# Patient Record
Sex: FEMALE | URBAN - METROPOLITAN AREA
[De-identification: names, ages, dates, MRNs, and addresses within clinical notes are randomized per-mention and may not be internally consistent; named-entity substitution may affect disease eponyms.]

---

## 2021-08-18 ENCOUNTER — TELEPHONE ENCOUNTER (OUTPATIENT)
Dept: URBAN - METROPOLITAN AREA CLINIC 72 | Facility: CLINIC | Age: 56
End: 2021-08-18

## 2021-10-22 ENCOUNTER — WEB ENCOUNTER (OUTPATIENT)
Dept: URBAN - METROPOLITAN AREA CLINIC 113 | Facility: CLINIC | Age: 56
End: 2021-10-22

## 2021-10-22 ENCOUNTER — OFFICE VISIT (OUTPATIENT)
Dept: URBAN - METROPOLITAN AREA CLINIC 113 | Facility: CLINIC | Age: 56
End: 2021-10-22
Payer: COMMERCIAL

## 2021-10-22 VITALS
HEART RATE: 63 BPM | SYSTOLIC BLOOD PRESSURE: 128 MMHG | RESPIRATION RATE: 18 BRPM | HEIGHT: 61 IN | TEMPERATURE: 97.7 F | DIASTOLIC BLOOD PRESSURE: 84 MMHG | BODY MASS INDEX: 32.47 KG/M2 | WEIGHT: 172 LBS

## 2021-10-22 DIAGNOSIS — R74.8 ELEVATED LIVER ENZYMES: ICD-10-CM

## 2021-10-22 PROCEDURE — 99204 OFFICE O/P NEW MOD 45 MIN: CPT | Performed by: INTERNAL MEDICINE

## 2021-10-22 RX ORDER — GABAPENTIN 100 MG/1
1 CAPSULE CAPSULE ORAL ONCE A DAY
Status: DISCONTINUED | COMMUNITY

## 2021-10-22 RX ORDER — HYDROCHLOROTHIAZIDE 12.5 MG/1
1 CAPSULE IN THE MORNING CAPSULE ORAL ONCE A DAY
Status: ACTIVE | COMMUNITY

## 2021-10-22 RX ORDER — AMLODIPINE BESYLATE AND BENAZEPRIL HYDROCHLORIDE 10; 20 MG/1; MG/1
AS DIRECTED CAPSULE ORAL
Status: ACTIVE | COMMUNITY

## 2021-10-22 RX ORDER — FAMOTIDINE 40 MG/1
1 TABLET AT BEDTIME TABLET, FILM COATED ORAL ONCE A DAY
Status: ACTIVE | COMMUNITY

## 2021-10-22 RX ORDER — METFORMIN HYDROCHLORIDE 1000 MG/1
1 TABLET WITH A MEAL TABLET, FILM COATED ORAL ONCE A DAY
Status: ACTIVE | COMMUNITY

## 2021-10-22 NOTE — HPI-TODAY'S VISIT:
Abby Noguera is a 56-year-old female with DM, HTN, dyslipidemia and diabetic neuropathy who is referred by [facility] ([place]) for elevated LFTs.    Iron studies done on January 25, 2020 had showed a ferritin of 140, and iron of 130, and a TIBC of 326, yielding a 40% iron saturation level. AST was 30 and ALT 53 at that time. The patient had additional laboratory work done on December 1, 2020 notable for an AST of 51 and ALT at 90. She had repeat labs done on April 19, 2021 showed an alkaline phosphatase of 128, AST of 85, ALT of 156, and albumin of 4.4, and a total bilirubin of 0.2.  Hepatitis serologies were drawn at that time and were negative.  The patient had an ultrasound examination done of the liver on August 24, 2021 which showed a normal gallbladder, common bile duct diameter of 2.7 mm, and an enlarged liver with diffuse fatty infiltration.  Repeat laboratory work done August 25, 2021 showed an AST of 111, ALT of 226, alkaline phosphatase of 125, and a total bilirubin of 0.4.  Repeat liver enzymes on October 6, 2021 showed an alk phos as of 123, and AST of 116, ALT of 230, and albumin 4.6.  The patient has some fullness in the right upper quadrant times, but no kenneth pain.  She does not drink alcohol, has no history of IV drug abuse, tattoos or blood transfusions.  There is no family history of liver enzyme elevation.  There is no family history of liver disease.  She does make reference to having been diagnosed as having "autoimmune liver disease" in the past.  She has not been treated.  The patient had upper endoscopy on several occasions in the past, the last about 3 years ago for complaints of reflux.  She had a colonoscopy done several years ago in [place], but does not recall the results.

## 2021-10-24 LAB
A/G RATIO: 1.8
ACTIN (SMOOTH MUSCLE) ANTIBODY: 15
ALBUMIN: 4.7
ALKALINE PHOSPHATASE: 106
ANA DIRECT: NEGATIVE
AST (SGOT): 111
BILIRUBIN, TOTAL: 0.3
BUN: 13
CALCIUM: 10.1
CHLORIDE: 101
CHOLESTEROL, TOTAL: 254
CREATININE: 0.57
EGFR IF AFRICN AM: 119
EGFR IF NONAFRICN AM: 103
GLOBULIN, TOTAL: 2.6
GLUCOSE: 115
HEMATOCRIT: 46.3
HEMOGLOBIN: 15.7
LDH: 202
MCH: 33.5
MCHC: 33.9
MCV: 99
MITOCHONDRIAL (M2) ANTIBODY: 24.1
NRBC: (no result)
PHOSPHORUS: 3.8
POTASSIUM: 4
PROTEIN, TOTAL: 7.3
RBC: 4.69
RDW: 12.7
SODIUM: 144
URIC ACID: 5.7
WBC: 5.8

## 2021-10-27 ENCOUNTER — TELEPHONE ENCOUNTER (OUTPATIENT)
Dept: URBAN - METROPOLITAN AREA CLINIC 113 | Facility: CLINIC | Age: 56
End: 2021-10-27

## 2021-10-27 ENCOUNTER — LAB OUTSIDE AN ENCOUNTER (OUTPATIENT)
Dept: URBAN - METROPOLITAN AREA CLINIC 113 | Facility: CLINIC | Age: 56
End: 2021-10-27

## 2021-10-27 RX ORDER — AMLODIPINE BESYLATE AND BENAZEPRIL HYDROCHLORIDE 10; 20 MG/1; MG/1
AS DIRECTED CAPSULE ORAL
Status: ACTIVE | COMMUNITY

## 2021-10-27 RX ORDER — HYDROCHLOROTHIAZIDE 12.5 MG/1
1 CAPSULE IN THE MORNING CAPSULE ORAL ONCE A DAY
Status: ACTIVE | COMMUNITY

## 2021-10-27 RX ORDER — METFORMIN HYDROCHLORIDE 1000 MG/1
1 TABLET WITH A MEAL TABLET, FILM COATED ORAL ONCE A DAY
Status: ACTIVE | COMMUNITY

## 2021-10-27 RX ORDER — FAMOTIDINE 40 MG/1
1 TABLET AT BEDTIME TABLET, FILM COATED ORAL ONCE A DAY
Status: ACTIVE | COMMUNITY

## 2021-10-27 RX ORDER — URSODIOL 300 MG/1
1 CAPSULE CAPSULE ORAL TWICE A DAY
Qty: 60 | Refills: 1 | OUTPATIENT
Start: 2021-10-27

## 2021-11-23 LAB
A/G RATIO: 1.9
ALBUMIN: 4.5
ALKALINE PHOSPHATASE: 96
ALT (SGPT): 182
AST (SGOT): 82
BILIRUBIN, TOTAL: 0.3
BUN/CREATININE RATIO: 17
BUN: 9
CALCIUM: 9.4
CARBON DIOXIDE, TOTAL: 28
CHLORIDE: 101
CREATININE: 0.53
EGFR IF AFRICN AM: 122
EGFR IF NONAFRICN AM: 106
GLOBULIN, TOTAL: 2.4
GLUCOSE: 104
POTASSIUM: 4.4
PROTEIN, TOTAL: 6.9
SODIUM: 139

## 2021-11-29 ENCOUNTER — ERX REFILL RESPONSE (OUTPATIENT)
Dept: URBAN - METROPOLITAN AREA CLINIC 113 | Facility: CLINIC | Age: 56
End: 2021-11-29

## 2021-11-29 RX ORDER — URSODIOL 300 MG/1
1 CAPSULE CAPSULE ORAL TWICE A DAY
Qty: 60 | Refills: 1 | OUTPATIENT

## 2021-11-29 RX ORDER — URSODIOL 300 MG/1
TAKE 1 CAPSULE BY MOUTH TWICE A DAY CAPSULE ORAL
Qty: 60 CAPSULE | Refills: 2 | OUTPATIENT

## 2021-12-27 ENCOUNTER — ERX REFILL RESPONSE (OUTPATIENT)
Dept: URBAN - METROPOLITAN AREA CLINIC 113 | Facility: CLINIC | Age: 56
End: 2021-12-27

## 2021-12-27 RX ORDER — URSODIOL 300 MG/1
TAKE 1 CAPSULE BY MOUTH TWICE A DAY CAPSULE ORAL
Qty: 60 CAPSULE | Refills: 2 | OUTPATIENT

## 2022-02-04 ENCOUNTER — OFFICE VISIT (OUTPATIENT)
Dept: URBAN - METROPOLITAN AREA CLINIC 113 | Facility: CLINIC | Age: 57
End: 2022-02-04
Payer: COMMERCIAL

## 2022-02-04 ENCOUNTER — ERX REFILL RESPONSE (OUTPATIENT)
Dept: URBAN - METROPOLITAN AREA CLINIC 113 | Facility: CLINIC | Age: 57
End: 2022-02-04

## 2022-02-04 VITALS
SYSTOLIC BLOOD PRESSURE: 136 MMHG | WEIGHT: 172 LBS | RESPIRATION RATE: 18 BRPM | DIASTOLIC BLOOD PRESSURE: 87 MMHG | HEART RATE: 69 BPM | BODY MASS INDEX: 32.47 KG/M2 | TEMPERATURE: 98.1 F | HEIGHT: 61 IN

## 2022-02-04 DIAGNOSIS — R74.8 ELEVATED LIVER ENZYMES: ICD-10-CM

## 2022-02-04 PROCEDURE — 99214 OFFICE O/P EST MOD 30 MIN: CPT | Performed by: INTERNAL MEDICINE

## 2022-02-04 RX ORDER — URSODIOL 300 MG/1
TAKE 1 CAPSULE BY MOUTH TWICE A DAY CAPSULE ORAL
Qty: 60 CAPSULE | Refills: 2 | OUTPATIENT

## 2022-02-04 RX ORDER — URSODIOL 300 MG/1
TAKE 1 CAPSULE BY MOUTH TWICE A DAY CAPSULE ORAL
Qty: 60 CAPSULE | Refills: 2 | Status: ACTIVE | COMMUNITY

## 2022-02-04 RX ORDER — HYDROCHLOROTHIAZIDE 12.5 MG/1
1 CAPSULE IN THE MORNING CAPSULE ORAL ONCE A DAY
Status: ACTIVE | COMMUNITY

## 2022-02-04 RX ORDER — METFORMIN HYDROCHLORIDE 1000 MG/1
1 TABLET WITH A MEAL TABLET, FILM COATED ORAL ONCE A DAY
Status: ACTIVE | COMMUNITY

## 2022-02-04 RX ORDER — FAMOTIDINE 40 MG/1
1 TABLET AT BEDTIME TABLET, FILM COATED ORAL ONCE A DAY
Status: DISCONTINUED | COMMUNITY

## 2022-02-04 RX ORDER — AMLODIPINE BESYLATE AND BENAZEPRIL HYDROCHLORIDE 10; 20 MG/1; MG/1
AS DIRECTED CAPSULE ORAL
Status: ACTIVE | COMMUNITY

## 2022-02-04 NOTE — HPI-TODAY'S VISIT:
Abby Noguera is a 56-year-old female with DM, HTN, dyslipidemia and diabetic neuropathy who is referred by [facility] ([place]) for elevated LFTs. She was last seen here for this on 10/22/21.   Iron studies done on January 25, 2020 had showed a ferritin of 140, and iron of 130, and a TIBC of 326, yielding a 40% iron saturation level. AST was 30 and ALT 53 at that time. The patient had additional laboratory work done on December 1, 2020 notable for an AST of 51 and ALT at 90. She had repeat labs done on April 19, 2021 showed an alkaline phosphatase of 128, AST of 85, ALT of 156, and albumin of 4.4, and a total bilirubin of 0.2.  Hepatitis serologies were drawn at that time and were negative.  The patient had an ultrasound examination done of the liver on August 24, 2021 which showed a normal gallbladder, common bile duct diameter of 2.7 mm, and an enlarged liver with diffuse fatty infiltration.  Repeat laboratory work done August 25, 2021 showed an AST of 111, ALT of 226, alkaline phosphatase of 125, and a total bilirubin of 0.4.  Repeat liver enzymes on October 6, 2021 showed an alk phos as of 123, and AST of 116, ALT of 230, and albumin 4.6.  The patient has some fullness in the right upper quadrant times, but no kenneth pain.  She does not drink alcohol, has no history of IV drug abuse, tattoos or blood transfusions.  There is no family history of liver enzyme elevation.  There is no family history of liver disease.  She does make reference to having been diagnosed as having "autoimmune liver disease" in the past.  She has not been treated.  The patient had upper endoscopy on several occasions in the past, the last about 3 years ago for complaints of reflux.  She had a colonoscopy done several years ago in [place], but does not recall the results.  She returns today with no new complaints.  She is accompanied by her daughter, who serves as an

## 2022-03-01 ENCOUNTER — ERX REFILL RESPONSE (OUTPATIENT)
Dept: URBAN - METROPOLITAN AREA CLINIC 113 | Facility: CLINIC | Age: 57
End: 2022-03-01

## 2022-03-01 RX ORDER — URSODIOL 300 MG/1
TAKE 1 CAPSULE BY MOUTH TWICE A DAY CAPSULE ORAL
Qty: 60 CAPSULE | Refills: 2 | OUTPATIENT

## 2022-03-03 LAB
A/G RATIO: 1.7
ALBUMIN: 4.4
ALKALINE PHOSPHATASE: 104
ALT (SGPT): 246
AST (SGOT): 146
BILIRUBIN, TOTAL: <0.2
BUN/CREATININE RATIO: 27
BUN: 17
CALCIUM: 9.8
CARBON DIOXIDE, TOTAL: 23
CHLORIDE: 102
CREATININE: 0.64
EGFR: 103
GGT: 47
GLOBULIN, TOTAL: 2.6
GLUCOSE: 150
POTASSIUM: 4.6
PROTEIN, TOTAL: 7
SODIUM: 143

## 2022-03-22 ENCOUNTER — ERX REFILL RESPONSE (OUTPATIENT)
Dept: URBAN - METROPOLITAN AREA CLINIC 113 | Facility: CLINIC | Age: 57
End: 2022-03-22

## 2022-03-22 RX ORDER — URSODIOL 300 MG/1
1 CAPSULE CAPSULE ORAL TWICE A DAY
Qty: 60 | Refills: 0 | OUTPATIENT

## 2022-03-22 RX ORDER — URSODIOL 300 MG/1
TAKE 1 CAPSULE BY MOUTH TWICE A DAY CAPSULE ORAL
Qty: 60 CAPSULE | Refills: 2 | OUTPATIENT

## 2022-03-24 ENCOUNTER — ERX REFILL RESPONSE (OUTPATIENT)
Dept: URBAN - METROPOLITAN AREA CLINIC 113 | Facility: CLINIC | Age: 57
End: 2022-03-24

## 2022-03-24 RX ORDER — URSODIOL 300 MG/1
1 CAPSULE CAPSULE ORAL TWICE A DAY
Qty: 60 | Refills: 0 | OUTPATIENT

## 2022-03-28 ENCOUNTER — TELEPHONE ENCOUNTER (OUTPATIENT)
Dept: URBAN - METROPOLITAN AREA CLINIC 113 | Facility: CLINIC | Age: 57
End: 2022-03-28

## 2022-03-28 RX ORDER — URSODIOL 300 MG/1
1 CAPSULE ORALLY TWICE A DAY CAPSULE ORAL
Qty: 180 | Refills: 3

## 2022-04-04 ENCOUNTER — ERX REFILL RESPONSE (OUTPATIENT)
Dept: URBAN - METROPOLITAN AREA CLINIC 113 | Facility: CLINIC | Age: 57
End: 2022-04-04

## 2022-04-04 RX ORDER — URSODIOL 300 MG/1
1 CAPSULE CAPSULE ORAL TWICE A DAY
Qty: 60 | Refills: 0 | OUTPATIENT

## 2022-05-09 ENCOUNTER — OFFICE VISIT (OUTPATIENT)
Dept: URBAN - METROPOLITAN AREA CLINIC 113 | Facility: CLINIC | Age: 57
End: 2022-05-09

## 2022-08-09 ENCOUNTER — OFFICE VISIT (OUTPATIENT)
Dept: URBAN - METROPOLITAN AREA CLINIC 113 | Facility: CLINIC | Age: 57
End: 2022-08-09

## 2023-08-23 ENCOUNTER — WEB ENCOUNTER (OUTPATIENT)
Dept: URBAN - METROPOLITAN AREA CLINIC 107 | Facility: CLINIC | Age: 58
End: 2023-08-23

## 2023-08-24 ENCOUNTER — LAB OUTSIDE AN ENCOUNTER (OUTPATIENT)
Dept: URBAN - METROPOLITAN AREA CLINIC 107 | Facility: CLINIC | Age: 58
End: 2023-08-24

## 2023-08-24 ENCOUNTER — OFFICE VISIT (OUTPATIENT)
Dept: URBAN - METROPOLITAN AREA CLINIC 107 | Facility: CLINIC | Age: 58
End: 2023-08-24
Payer: COMMERCIAL

## 2023-08-24 ENCOUNTER — DASHBOARD ENCOUNTERS (OUTPATIENT)
Age: 58
End: 2023-08-24

## 2023-08-24 VITALS
HEIGHT: 61 IN | BODY MASS INDEX: 31.91 KG/M2 | SYSTOLIC BLOOD PRESSURE: 129 MMHG | WEIGHT: 169 LBS | DIASTOLIC BLOOD PRESSURE: 77 MMHG | RESPIRATION RATE: 18 BRPM | HEART RATE: 72 BPM | TEMPERATURE: 97.3 F

## 2023-08-24 DIAGNOSIS — R74.8 ELEVATED LIVER ENZYMES: ICD-10-CM

## 2023-08-24 DIAGNOSIS — K21.00 GASTROESOPHAGEAL REFLUX DISEASE WITH ESOPHAGITIS WITHOUT HEMORRHAGE: ICD-10-CM

## 2023-08-24 DIAGNOSIS — Z86.010 HISTORY OF ADENOMATOUS POLYP OF COLON: ICD-10-CM

## 2023-08-24 PROBLEM — 266433003: Status: ACTIVE | Noted: 2023-08-24

## 2023-08-24 PROBLEM — 429047008: Status: ACTIVE | Noted: 2023-08-24

## 2023-08-24 PROCEDURE — 99215 OFFICE O/P EST HI 40 MIN: CPT | Performed by: INTERNAL MEDICINE

## 2023-08-24 RX ORDER — URSODIOL 300 MG/1
1 CAPSULE CAPSULE ORAL TWICE A DAY
Qty: 60 | Refills: 0 | Status: ACTIVE | COMMUNITY

## 2023-08-24 RX ORDER — HYDROCHLOROTHIAZIDE 12.5 MG/1
1 CAPSULE IN THE MORNING CAPSULE ORAL ONCE A DAY
Status: ACTIVE | COMMUNITY

## 2023-08-24 RX ORDER — AMLODIPINE BESYLATE AND BENAZEPRIL HYDROCHLORIDE 10; 20 MG/1; MG/1
AS DIRECTED CAPSULE ORAL
Status: ACTIVE | COMMUNITY

## 2023-08-24 RX ORDER — FAMOTIDINE 40 MG/1
1 TABLET AT BEDTIME TABLET, FILM COATED ORAL ONCE A DAY
Status: ACTIVE | COMMUNITY

## 2023-08-24 RX ORDER — OMEPRAZOLE 40 MG/1
TAKE 1 CAPSULE BY MOUTH EVERY DAY CAPSULE, DELAYED RELEASE ORAL TWICE DAILY
Qty: 60 | Refills: 3 | OUTPATIENT
Start: 2023-08-24

## 2023-08-24 RX ORDER — METFORMIN HYDROCHLORIDE 1000 MG/1
1 TABLET WITH A MEAL TABLET, FILM COATED ORAL ONCE A DAY
Status: ACTIVE | COMMUNITY

## 2023-08-24 NOTE — EXAM-FUNCTIONAL ASSESSMENT
General--no acute distress Eyes--anicteric HENT--normocephalic, atraumatic head Neck--no lymphadenopathy Chest--normal breath sounds Heart--regular rate and rhythm; 2/6 systolic murmur RUSB Abdomen--soft, non tender, non distended, bowel sounds present Musculoskeletal--normal gait and station Skin--no rashes Neurologic--Alert and oriented x 3 Psychiatric--stable mood, appropriate affect

## 2023-08-24 NOTE — HPI-TODAY'S VISIT:
Abby Noguera is a 58-year-old female with DM, HTN, dyslipidemia and diabetic neuropathy who is referred by [facility] ([place]) for elevated LFTs. She was last seen here for this on 10/22/21.   Iron studies done on January 25, 2020 had showed a ferritin of 140, and iron of 130, and a TIBC of 326, yielding a 40% iron saturation level. AST was 30 and ALT 53 at that time. The patient had additional laboratory work done on December 1, 2020 notable for an AST of 51 and ALT at 90. She had repeat labs done on April 19, 2021 showed an alkaline phosphatase of 128, AST of 85, ALT of 156, and albumin of 4.4, and a total bilirubin of 0.2.  Hepatitis serologies were drawn at that time and were negative.  The patient had an ultrasound examination done of the liver on August 24, 2021 which showed a normal gallbladder, common bile duct diameter of 2.7 mm, and an enlarged liver with diffuse fatty infiltration.  Repeat laboratory work done August 25, 2021 showed an AST of 111, ALT of 226, alkaline phosphatase of 125, and a total bilirubin of 0.4.  Repeat liver enzymes on October 6, 2021 showed an alk phos as of 123, and AST of 116, ALT of 230, and albumin 4.6.  The patient has some fullness in the right upper quadrant at times, but no kenneth pain.  She does not drink alcohol, has no history of IV drug abuse, tattoos or blood transfusions.  There is no family history of liver enzyme elevation.  There is no family history of liver disease.  She does make reference to having been diagnosed as having "autoimmune liver disease" in the past.  At the time of her last office visit, she had not been treated.  The patient had upper endoscopy on several occasions in the past, the last about 3 years ago for complaints of reflux.  She had a colonoscopy done several years ago in [place], but does not recall the results.  After her last office visit, the patient had labs done including a complete metabolic panel, autoimmune serologies and an antimitochondrial antibody.  Her antimitochondrial antibody was positive at 24.1.  Alkaline phosphatase is 104, , , total bilirubin less than 0.2, albumin 4.4, and GGT 47.  She was begun on ursodiol 300 mg p.o. twice daily but failed to return for follow-up.  Her primary care physician in [place] did forward labs from 6/1/2023 showing an AST of 46, ALT of 80, alkaline phosphatase of 98, total bilirubin of less than 0.2, and an albumin of 4.6.  She returns today after a protracted absence, having missed several office visits, most recently on 5/9/2022 and 8/9/2022. She is accompanied by her daughter, who serves as an . On this occasion, she was able to provide a more detailed history.  She is having reflux every day, despite taking famotidine 40 mg p.o. twice daily.  She is not on a PPI.  She describes dysphagia on occasion, and tells me that she has had dilation of esophageal strictures in the past.  She also has a history of colon polyps, and last had colonoscopy roughly 5 years ago.  She tells me that labs have been done by her primary care physician, although she only knows that they were abnormal.

## 2023-09-11 ENCOUNTER — ERX REFILL RESPONSE (OUTPATIENT)
Dept: URBAN - METROPOLITAN AREA CLINIC 113 | Facility: CLINIC | Age: 58
End: 2023-09-11

## 2023-09-11 RX ORDER — URSODIOL 300 MG/1
TAKE 1 CAPSULE BY MOUTH TWICE A DAY FOR 90 DAYS CAPSULE ORAL
Qty: 180 CAPSULE | Refills: 3 | OUTPATIENT

## 2023-09-11 RX ORDER — URSODIOL 300 MG/1
TAKE 1 CAPSULE BY MOUTH TWICE A DAY FOR 90 DAYS CAPSULE ORAL
Qty: 180 CAPSULE | Refills: 4 | OUTPATIENT

## 2023-09-19 ENCOUNTER — ERX REFILL RESPONSE (OUTPATIENT)
Dept: URBAN - METROPOLITAN AREA CLINIC 107 | Facility: CLINIC | Age: 58
End: 2023-09-19

## 2023-09-19 RX ORDER — OMEPRAZOLE 40 MG/1
TAKE 1 CAPSULE BY MOUTH EVERY DAY CAPSULE, DELAYED RELEASE ORAL TWICE DAILY
Qty: 60 | Refills: 3 | OUTPATIENT

## 2023-09-19 RX ORDER — OMEPRAZOLE 40 MG/1
TAKE 1 CAPSULE BY MOUTH TWICE A DAY CAPSULE, DELAYED RELEASE ORAL
Qty: 60 CAPSULE | Refills: 3 | OUTPATIENT

## 2023-09-22 ENCOUNTER — TELEPHONE ENCOUNTER (OUTPATIENT)
Dept: URBAN - METROPOLITAN AREA CLINIC 113 | Facility: CLINIC | Age: 58
End: 2023-09-22

## 2023-10-13 ENCOUNTER — TELEPHONE ENCOUNTER (OUTPATIENT)
Dept: URBAN - METROPOLITAN AREA CLINIC 6 | Facility: CLINIC | Age: 58
End: 2023-10-13

## 2023-11-03 ENCOUNTER — OFFICE VISIT (OUTPATIENT)
Dept: URBAN - METROPOLITAN AREA SURGERY CENTER 25 | Facility: SURGERY CENTER | Age: 58
End: 2023-11-03

## 2023-11-16 ENCOUNTER — OFFICE VISIT (OUTPATIENT)
Dept: URBAN - METROPOLITAN AREA SURGERY CENTER 25 | Facility: SURGERY CENTER | Age: 58
End: 2023-11-16

## 2023-11-28 ENCOUNTER — OFFICE VISIT (OUTPATIENT)
Dept: URBAN - METROPOLITAN AREA CLINIC 107 | Facility: CLINIC | Age: 58
End: 2023-11-28

## 2025-01-21 ENCOUNTER — ERX REFILL RESPONSE (OUTPATIENT)
Dept: URBAN - METROPOLITAN AREA CLINIC 113 | Facility: CLINIC | Age: 60
End: 2025-01-21

## 2025-01-21 RX ORDER — URSODIOL 300 MG/1
TAKE 1 CAPSULE BY MOUTH TWICE A DAY CAPSULE ORAL
Qty: 180 CAPSULE | Refills: 3 | OUTPATIENT

## 2025-01-21 RX ORDER — URSODIOL 300 MG/1
TAKE 1 CAPSULE BY MOUTH TWICE A DAY FOR 90 DAYS CAPSULE ORAL
Qty: 180 CAPSULE | Refills: 3 | OUTPATIENT